# Patient Record
Sex: FEMALE | Race: WHITE
[De-identification: names, ages, dates, MRNs, and addresses within clinical notes are randomized per-mention and may not be internally consistent; named-entity substitution may affect disease eponyms.]

---

## 2018-02-09 ENCOUNTER — HOSPITAL ENCOUNTER (EMERGENCY)
Dept: HOSPITAL 59 - ER | Age: 43
Discharge: ANOTHER HOSPITAL | End: 2018-02-09
Payer: COMMERCIAL

## 2018-02-09 DIAGNOSIS — R06.00: ICD-10-CM

## 2018-02-09 DIAGNOSIS — R21: ICD-10-CM

## 2018-02-09 DIAGNOSIS — R11.0: ICD-10-CM

## 2018-02-09 DIAGNOSIS — R20.2: ICD-10-CM

## 2018-02-09 DIAGNOSIS — R07.2: Primary | ICD-10-CM

## 2018-02-09 DIAGNOSIS — I10: ICD-10-CM

## 2018-02-09 LAB
ANION GAP SERPL CALC-SCNC: 15 MMOL/L (ref 7–16)
APTT BLD: 23.6 SECONDS (ref 24.5–39.1)
BUN SERPL-MCNC: 13 MG/DL (ref 6–20)
CK MB SERPL-MCNC: 1.9 NG/ML (ref ?–3.77)
CO2 SERPL-SCNC: 26 MMOL/L (ref 22–29)
CREAT SERPL-MCNC: 0.8 MG/DL (ref 0.5–0.9)
CREATINE PHOSPHOKINASE: 87 U/L (ref 26–192)
EOSINOPHIL NFR BLD: 1 % (ref 0–6)
ERYTHROCYTE [DISTWIDTH] IN BLOOD BY AUTOMATED COUNT: 14.9 % (ref 11.5–14.5)
EST GLOMERULAR FILTRATION RATE: > 60 ML/MIN
GLUCOSE SERPL-MCNC: 150 MG/DL (ref 74–109)
HCT VFR BLD CALC: 41.5 % (ref 35–47)
HGB BLD-MCNC: 14.1 GM/DL (ref 11.6–16)
INR PPP: 0.9
LYMPHOCYTES NFR BLD: 17 % (ref 16–45)
MCH RBC QN AUTO: 29.1 PG (ref 27–33)
MCHC RBC AUTO-ENTMCNC: 34 G/DL (ref 32–36)
MCV RBC AUTO: 85.7 FL (ref 81–97)
MONOCYTES NFR BLD: 9 % (ref 0–9)
NEUTROPHILS NFR BLD AUTO: 73 % (ref 47–80)
PLATELET # BLD: 376 K/UL (ref 130–400)
PMV BLD AUTO: 8.5 FL (ref 7.4–10.4)
PROTHROMBIN TIME: 10.2 SECONDS (ref 9.5–12.1)
RBC # BLD AUTO: 4.84 M/UL (ref 3.8–5.4)
WBC # BLD AUTO: 12.5 K/UL (ref 4.2–12.2)

## 2018-02-09 PROCEDURE — 85730 THROMBOPLASTIN TIME PARTIAL: CPT

## 2018-02-09 PROCEDURE — 82553 CREATINE MB FRACTION: CPT

## 2018-02-09 PROCEDURE — 71045 X-RAY EXAM CHEST 1 VIEW: CPT

## 2018-02-09 PROCEDURE — 85027 COMPLETE CBC AUTOMATED: CPT

## 2018-02-09 PROCEDURE — 96374 THER/PROPH/DIAG INJ IV PUSH: CPT

## 2018-02-09 PROCEDURE — 96375 TX/PRO/DX INJ NEW DRUG ADDON: CPT

## 2018-02-09 PROCEDURE — 99285 EMERGENCY DEPT VISIT HI MDM: CPT

## 2018-02-09 PROCEDURE — 80048 BASIC METABOLIC PNL TOTAL CA: CPT

## 2018-02-09 PROCEDURE — 84484 ASSAY OF TROPONIN QUANT: CPT

## 2018-02-09 PROCEDURE — 85610 PROTHROMBIN TIME: CPT

## 2018-02-09 PROCEDURE — 82550 ASSAY OF CK (CPK): CPT

## 2018-02-09 PROCEDURE — 93010 ELECTROCARDIOGRAM REPORT: CPT

## 2018-02-09 PROCEDURE — 93005 ELECTROCARDIOGRAM TRACING: CPT

## 2018-02-09 NOTE — EMERGENCY DEPARTMENT RECORD
History of Present Illness





- General


Chief Complaint: Chest Pain


Stated Complaint: CHEST PAIN


Time Seen by Provider: 18 16:11


Source: Patient


Mode of Arrival: Ambulatory


Limitations: No limitations





- History of Present Illness


Initial Comments: 


The patient is here due to developing a whole body rash, TIFFANY, and retrosternal 

CP after taking an Amoxicillin about 30 minutes ago. She also has had some 

nausea but denies any cough, congestion, or back pain. She states she has had 

Amox in the past and has never had any allergy issues. She describes the pain 

as a sharp burning retrosternally that is nonradiating. She has had no sweating 

with the rash or pain. The patient has multiple cardiac risk factors but has no 

hx of cardiac issues. The patient denies any feeling like her throat is closing 

or voice changes.





Onset/Timin


-: Minutes(s)


Onset: During rest


Pain Location: Substernal


Pain Radiation: NII HOBSON


Severity scale (1-10): 8


Quality: Other


Consistency: Constant


Improves With: Nothing


Worsens With: Nothing


Context: Recent illness, Other


Anginal Symptoms: Nausea


Treatments Prior to Arrival: None





- Related Data


On Oral Contraceptives:  (IUD)


 Home Medications











 Medication  Instructions  Recorded  Confirmed  Last Taken


 


Alprazolam [Alprazolam] 1 mg PO ASDIR PRN 18 Unknown


 


Amitriptyline HCl [Elavil] 10 mg PO ASDIR 18 Unknown


 


Cyclobenzaprine HCl 10 mg PO ASDIR 18 Unknown


 


Lisinopril/Hydrochlorothiazide 1 each PO DAILY 18 Unknown





[Lisinopril-Hctz 10-12.5 mg Tab]    


 


Pantoprazole Sodium 40 mg PO DAILY 18 Unknown


 


Tramadol HCl [Tramadol HCl] 50 mg PO ASDIR PRN 18 Unknown











 Allergies











Allergy/AdvReac Type Severity Reaction Status Date / Time


 


No Known Drug Allergies Allergy   Verified 18 16:07














Travel Screening





- Travel/Exposure Within Last 30 Days


Have you traveled within the last 30 days?: No





Review of Systems


Constitutional: Denies: Chills, Fever


Eyes: Denies: Eye discharge


ENT: Reports: Ear pain.  Denies: Congestion


Respiratory: Denies: Cough





Past Medical History





- SOCIAL HISTORY


Smoking Status: Never smoker


Alcohol Use: None


Drug Use: None





- RESPIRATORY


Hx Respiratory Disorders: No





- CARDIOVASCULAR


Hx Cardio Disorders: Yes


Hx Hypertension: Yes





- NEURO


Hx Neuro Disorders: Yes


Hx Headaches: Yes





- GI


Hx GI Disorders: Yes


Hx Reflux: Yes





- 


Hx Genitourinary Disorders: No





- ENDOCRINE


Hx Endocrine Disorders: No





- MUSCULOSKELETAL


Hx Musculoskeletal Disorders: Yes


Hx Arthritis: Yes





- PSYCH


Hx Psych Problems: Yes


Hx Anxiety: Yes





- HEMATOLOGY/ONCOLOGY


Hx Hematology/Oncology Disorders: No





Family Medical History


Any Significant Family History?: Yes


Hx Heart Disease: Father, Mother, Brother/Sister, Grandparents





Physical Exam





- General


General Appearance: Alert, Oriented x3, Cooperative, No acute distress





- Head


Head exam: Atraumatic, Normocephalic, Normal inspection





- Eye


Eye exam: Normal appearance, PERRL





- ENT


ENT exam: negative: TM's normal bilaterally (There is bilateraly erythema, 

effusions and a loss of landmarks on the L.)


Throat exam: Normal inspection.  negative: Tonsillar erythema, Tonsillar exudate





- Neck


Neck exam: Normal inspection, Full ROM.  negative: Tenderness





- Respiratory


Respiratory exam: Normal lung sounds bilaterally.  negative: Respiratory 

distress





- Cardiovascular


Cardiovascular Exam: Regular rate, Normal rhythm, Normal heart sounds





- GI/Abdominal


GI/Abdominal exam: Soft, Normal bowel sounds.  negative: Tenderness





- Extremities


Extremities exam: Normal inspection, Full ROM, Normal capillary refill.  

negative: Tenderness





- Neurological


Neurological exam: Alert.  negative: Motor sensory deficit





- Skin


Skin exam: Erythema (There is a diffuse body erythroderma that blanches.), Rash





Course





 Vital Signs











  18





  16:01


 


Temperature 98.0 F


 


Pulse Rate 124 H


 


Respiratory 22





Rate 


 


Blood Pressure 156/80


 


Pulse Ox 97














- Reevaluation(s)


Reevaluation #1: 


The patient is doing better at this time. Her pain is improving and she is 

feeling more relaxed.


18 16:31





Reevaluation #2: 


The patient is doing better. Her vitals are improved and the rash much 

improved. There is no nausea or TIFFANY but she is still having the retrosternal 

aching/burning. Due to the fact the patient has multiple cardiac risk factors I 

will order one SL NTG.


18 16:57





18 17:05


The patient is doing better after the SL NTG. The discomfort is almost gone.


Reevaluation #3: 


The patient is doing very well at this time. Her BP and HR are very stable and 

the pain has resolved.


18 17:26





Reevaluation #4: 


I did discuss the case with Dr. Interiano and he did accept the patient as a 

direct admit to [facility].


18 17:36








Medical Decision Making





- Data Complexity


MDM Data: Labs Ordered and/or Reviewed, X-Ray Ordered and/or Reviewed, EKG 

Ordered and/or Reviewed





- Lab Data


Result diagrams: 


 18 16:15





 18 16:15





- EKG Data


-: EKG Interpreted by Me (ST at 118. Anterior infarct old. Neg ST-T changes.)





- Radiology Data


Radiology results: Report reviewed (CXR: Neg per rad.)





Disposition


Disposition: Transfer


Disposition: Acute Care Hospital Transfer


Transfer To: [facility]


Reason For Transfer: Cardiology


Accepting Physician: Jaydon


Time Discussed w/Accepting Physician: 17:36


Condition: (2) Stable


Forms:  Patient Portal Access


Time of Disposition: 17:36





Quality





- Quality Measures


Quality Measures: N/A





- Blood Pressure Screening


View Details: Yes


Does Patient Have Any of the Following: Active Dx of HTN


Blood Pressure Classification: Pre-Hypertensive BP Reading


Systolic Measurement: 156


Diastolic Measurement: 80


Screening for High Blood Pressure: Patient Exclusion, Hx of HTN []

## 2018-02-10 NOTE — RADIOLOGY REPORT
DATE:  02/09/2018. 



EXAM:  PORTABLE CHEST.



COMPARISON:  None.



HISTORY:  Chest pain.



TECHNIQUE:  Portable AP upright view of the chest was performed.  



FINDINGS:  Heart size is normal.  Lung fields are clear. The osseous structures 
are normal.



IMPRESSION:  

NEGATIVE CHEST EXAMINATION.



JOB NUMBER:  [id number]
MTDD

## 2018-02-12 NOTE — EMERGENCY DEPARTMENT RECORD
History of Present Illness





- General


Chief Complaint: Chest Pain


Stated Complaint: CHEST PAIN


Time Seen by Provider: 18 16:11


Source: Patient


Mode of Arrival: Ambulatory


Limitations: No limitations





- History of Present Illness


Onset/Timin


-: Minutes(s)


Onset: During rest


Pain Location: Substernal


Pain Radiation: RUENII


Severity scale (1-10): 8


Quality: Other


Consistency: Constant


Improves With: Nothing


Worsens With: Nothing


Context: Recent illness, Other


Anginal Symptoms: Nausea


Treatments Prior to Arrival: None





- Related Data


On Oral Contraceptives:  (IUD)


 Home Medications











 Medication  Instructions  Recorded  Confirmed  Last Taken


 


Alprazolam [Alprazolam] 1 mg PO ASDIR PRN 18 Unknown


 


Amitriptyline HCl [Elavil] 10 mg PO ASDIR 18 Unknown


 


Cyclobenzaprine HCl 10 mg PO ASDIR 18 Unknown


 


Lisinopril/Hydrochlorothiazide 1 each PO DAILY 18 Unknown





[Lisinopril-Hctz 10-12.5 mg Tab]    


 


Pantoprazole Sodium 40 mg PO DAILY 18 Unknown


 


Tramadol HCl [Tramadol HCl] 50 mg PO ASDIR PRN 18 Unknown











 Allergies











Allergy/AdvReac Type Severity Reaction Status Date / Time


 


No Known Drug Allergies Allergy   Verified 18 16:07














Travel Screening





- Travel/Exposure Within Last 30 Days


Have you traveled within the last 30 days?: No





Review of Systems


Constitutional: Denies: Chills, Fever


Eyes: Denies: Eye discharge


ENT: Reports: Ear pain.  Denies: Congestion


Respiratory: Denies: Cough





Past Medical History





- SOCIAL HISTORY


Smoking Status: Never smoker


Alcohol Use: None


Drug Use: None





- RESPIRATORY


Hx Respiratory Disorders: No





- CARDIOVASCULAR


Hx Cardio Disorders: Yes


Hx Hypertension: Yes





- NEURO


Hx Neuro Disorders: Yes


Hx Headaches: Yes





- GI


Hx GI Disorders: Yes


Hx Reflux: Yes





- 


Hx Genitourinary Disorders: No





- ENDOCRINE


Hx Endocrine Disorders: No





- MUSCULOSKELETAL


Hx Musculoskeletal Disorders: Yes


Hx Arthritis: Yes





- PSYCH


Hx Psych Problems: Yes


Hx Anxiety: Yes





- HEMATOLOGY/ONCOLOGY


Hx Hematology/Oncology Disorders: No





Family Medical History


Any Significant Family History?: Yes


Hx Heart Disease: Father, Mother, Brother/Sister, Grandparents





Physical Exam





- General


Limitations: No limitations





Course





 Vital Signs











  18





  16:01 16:46 17:27


 


Temperature 98.0 F  


 


Pulse Rate 124 H  


 


Pulse Rate [  106 H 110 H





Cardiac Monitor   





]   


 


Respiratory 22 18 18





Rate   


 


Blood Pressure 156/80  


 


Blood Pressure  146/81 138/80





[Left Arm]   


 


Blood Pressure   





[Right Arm]   


 


Pulse Ox 97 100 100














  18





  18:57 19:40


 


Temperature  


 


Pulse Rate  


 


Pulse Rate [ 120 H 133 H





Cardiac Monitor  





]  


 


Respiratory 20 20





Rate  


 


Blood Pressure  


 


Blood Pressure 139/78 





[Left Arm]  


 


Blood Pressure  132/84





[Right Arm]  


 


Pulse Ox 94 L 98














Medical Decision Making





- Lab Data


Result diagrams: 


 18 16:15





 18 16:15





 Lab Results











  18 Range/Units





  16:15 16:15 16:15 


 


WBC  12.5 H    (4.2-12.2)  K/uL


 


RBC  4.84    (3.80-5.40)  M/uL


 


Hgb  14.1    (11.6-16.0)  gm/dl


 


Hct  41.5    (35.0-47.0)  %


 


MCV  85.7    (81-97)  fl


 


MCH  29.1    (27-33)  pg


 


MCHC  34.0    (32-36)  g/dl


 


RDW  14.9 H    (11.5-14.5)  %


 


Plt Count  376    (130-400)  K/uL


 


MPV  8.5    (7.4-10.4)  fl


 


Neutrophils %  73.0    (47-80)  %


 


Eosinophils %  Not Reportable    


 


Basophils %  Not Reportable    


 


Lymphocytes  17.0    (16-45)  %


 


Monocytes  9.0    (0-9)  %


 


Eosinophil Count  1.0    (0-6)  %


 


PT   10.2   (9.5-12.1)  SECONDS


 


INR   0.9   


 


APTT   23.6 L   (24.5-39.1)  SECONDS


 


Sodium    138  (136-145)  mmol/L


 


Potassium    3.7  (3.4-4.5)  mmol/L


 


Chloride    97 L  ()  mmol/L


 


Carbon Dioxide    26.0  (22-29)  mmol/L


 


Anion Gap    15.0  (7-16)  


 


BUN    13  (6-20)  mg/dL


 


Creatinine    0.8  (0.5-0.9)  mg/dL


 


Estimated GFR    > 60  mL/min


 


Random Glucose    150 H  ()  mg/dL


 


Calcium    9.2  (8.6-10.0)  mg/dL


 


Creatine Kinase    87  ()  U/L


 


CK-MB (CK-2)    1.9  (<3.77)  ng/mL


 


Troponin T    < 0.010  (0-0.010)  ng/mL














Disposition


Disposition: Transfer


Clinical Impression: 


 Chest pain at rest





Disposition: Acute Care Hospital Transfer


Transfer To: [facility]


Reason For Transfer: Cardiology


Accepting Physician: Jaydon.


Time Discussed w/Accepting Physician: 08:53


Condition: (2) Stable


Forms:  Patient Portal Access


Time of Disposition: 08:53





Quality





- Quality Measures


Quality Measures: N/A





- Blood Pressure Screening


View Details: Yes


Does Patient Have Any of the Following: Active Dx of HTN


Blood Pressure Classification: Pre-Hypertensive BP Reading


Systolic Measurement: 156


Diastolic Measurement: 80


Screening for High Blood Pressure: Patient Exclusion, Hx of HTN []